# Patient Record
Sex: FEMALE | Race: WHITE | NOT HISPANIC OR LATINO | URBAN - METROPOLITAN AREA
[De-identification: names, ages, dates, MRNs, and addresses within clinical notes are randomized per-mention and may not be internally consistent; named-entity substitution may affect disease eponyms.]

---

## 2024-12-10 ENCOUNTER — OFFICE VISIT (OUTPATIENT)
Dept: URGENT CARE | Age: 44
End: 2024-12-10
Payer: COMMERCIAL

## 2024-12-10 VITALS
HEART RATE: 78 BPM | OXYGEN SATURATION: 98 % | TEMPERATURE: 98.3 F | SYSTOLIC BLOOD PRESSURE: 99 MMHG | RESPIRATION RATE: 16 BRPM | DIASTOLIC BLOOD PRESSURE: 68 MMHG

## 2024-12-10 DIAGNOSIS — R30.0 DYSURIA: ICD-10-CM

## 2024-12-10 DIAGNOSIS — R31.29 MICROSCOPIC HEMATURIA: ICD-10-CM

## 2024-12-10 DIAGNOSIS — N39.0 URINARY TRACT INFECTION WITHOUT HEMATURIA, SITE UNSPECIFIED: Primary | ICD-10-CM

## 2024-12-10 LAB
POC APPEARANCE, URINE: CLEAR
POC BILIRUBIN, URINE: NEGATIVE
POC BLOOD, URINE: ABNORMAL
POC COLOR, URINE: YELLOW
POC GLUCOSE, URINE: NEGATIVE MG/DL
POC KETONES, URINE: NEGATIVE MG/DL
POC LEUKOCYTES, URINE: NEGATIVE
POC NITRITE,URINE: NEGATIVE
POC PH, URINE: 6 PH
POC PROTEIN, URINE: NEGATIVE MG/DL
POC SPECIFIC GRAVITY, URINE: 1.02
POC UROBILINOGEN, URINE: 0.2 EU/DL

## 2024-12-10 PROCEDURE — 87086 URINE CULTURE/COLONY COUNT: CPT

## 2024-12-10 PROCEDURE — 99204 OFFICE O/P NEW MOD 45 MIN: CPT | Performed by: SPECIALIST

## 2024-12-10 PROCEDURE — 81003 URINALYSIS AUTO W/O SCOPE: CPT | Performed by: SPECIALIST

## 2024-12-10 PROCEDURE — 87186 SC STD MICRODIL/AGAR DIL: CPT

## 2024-12-10 RX ORDER — NITROFURANTOIN 25; 75 MG/1; MG/1
100 CAPSULE ORAL 2 TIMES DAILY
Qty: 14 CAPSULE | Refills: 0 | Status: SHIPPED | OUTPATIENT
Start: 2024-12-10 | End: 2024-12-17

## 2024-12-10 ASSESSMENT — ENCOUNTER SYMPTOMS
DYSURIA: 1
FREQUENCY: 1
GASTROINTESTINAL NEGATIVE: 1

## 2024-12-10 NOTE — PROGRESS NOTES
Subjective   Patient ID: Josephine Mancuso is a 43 y.o. female. They present today with a chief complaint of UTI (Pt presents with what she believes is a UTI burning and discomfort.  ).    History of Present Illness    UTI      Past Medical History  Allergies as of 12/10/2024 - Reviewed 12/10/2024   Allergen Reaction Noted    Egg GI Upset 09/30/2010    Gluten GI Upset 09/30/2010    Lactase GI Upset 09/30/2010       (Not in a hospital admission)       Past Medical History:   Diagnosis Date    Enterocolitis due to Clostridium difficile, not specified as recurrent     Clostridium difficile colitis       History reviewed. No pertinent surgical history.         Review of Systems  Review of Systems   Gastrointestinal: Negative.    Genitourinary:  Positive for dysuria and frequency.                                  Objective    Vitals:    12/10/24 1023   BP: 99/68   BP Location: Left arm   Patient Position: Sitting   Pulse: 78   Resp: 16   Temp: 36.8 °C (98.3 °F)   SpO2: 98%     Patient's last menstrual period was 11/14/2024 (exact date).    Physical Exam  Constitutional:       Appearance: Normal appearance.   Abdominal:      Tenderness: There is abdominal tenderness in the suprapubic area.   Neurological:      Mental Status: She is alert.         Procedures    Point of Care Test & Imaging Results from this visit  Results for orders placed or performed in visit on 12/10/24   POCT UA Automated manually resulted   Result Value Ref Range    POC Color, Urine Yellow Straw, Yellow, Light-Yellow    POC Appearance, Urine Clear Clear    POC Glucose, Urine NEGATIVE NEGATIVE mg/dl    POC Bilirubin, Urine NEGATIVE NEGATIVE    POC Ketones, Urine NEGATIVE NEGATIVE mg/dl    POC Specific Gravity, Urine 1.020 1.005 - 1.035    POC Blood, Urine SMALL (1+) (A) NEGATIVE    POC PH, Urine 6.0 No Reference Range Established PH    POC Protein, Urine NEGATIVE NEGATIVE, 30 (1+) mg/dl    POC Urobilinogen, Urine 0.2 0.2, 1.0 EU/DL    Poc Nitrite, Urine  NEGATIVE NEGATIVE    POC Leukocytes, Urine NEGATIVE NEGATIVE      No results found.    Diagnostic study results (if any) were reviewed by Reena Davis MD.    Assessment/Plan   Allergies, medications, history, and pertinent labs/EKGs/Imaging reviewed by Reena Davis MD.     Medical Decision Making      Orders and Diagnoses  Diagnoses and all orders for this visit:  Urinary tract infection without hematuria, site unspecified  -     POCT UA Automated manually resulted      Medical Admin Record      Patient disposition: Home    Electronically signed by Reena Davis MD  10:41 AM

## 2024-12-13 LAB — BACTERIA UR CULT: ABNORMAL

## 2024-12-22 ENCOUNTER — OFFICE VISIT (OUTPATIENT)
Dept: URGENT CARE | Age: 44
End: 2024-12-22
Payer: COMMERCIAL

## 2024-12-22 VITALS
WEIGHT: 113 LBS | TEMPERATURE: 98.2 F | OXYGEN SATURATION: 98 % | DIASTOLIC BLOOD PRESSURE: 62 MMHG | HEART RATE: 86 BPM | SYSTOLIC BLOOD PRESSURE: 101 MMHG | RESPIRATION RATE: 20 BRPM

## 2024-12-22 DIAGNOSIS — J40 BRONCHITIS: ICD-10-CM

## 2024-12-22 DIAGNOSIS — J06.9 VIRAL URI: Primary | ICD-10-CM

## 2024-12-22 PROCEDURE — 99213 OFFICE O/P EST LOW 20 MIN: CPT | Performed by: PHYSICIAN ASSISTANT

## 2024-12-22 RX ORDER — AZITHROMYCIN 250 MG/1
TABLET, FILM COATED ORAL
Qty: 6 TABLET | Refills: 0 | Status: SHIPPED | OUTPATIENT
Start: 2024-12-22 | End: 2024-12-26

## 2024-12-22 RX ORDER — METHYLPREDNISOLONE 4 MG/1
TABLET ORAL
Qty: 21 TABLET | Refills: 0 | Status: SHIPPED | OUTPATIENT
Start: 2024-12-22

## 2024-12-22 RX ORDER — BENZONATATE 200 MG/1
200 CAPSULE ORAL 3 TIMES DAILY PRN
Qty: 20 CAPSULE | Refills: 0 | Status: SHIPPED | OUTPATIENT
Start: 2024-12-22 | End: 2024-12-29

## 2024-12-22 ASSESSMENT — ENCOUNTER SYMPTOMS: COUGH: 1

## 2024-12-22 NOTE — PROGRESS NOTES
Subjective   Patient ID: Josephine Mancuso is a 44 y.o. female. They present today with a chief complaint of Cough (Week ).    History of Present Illness  Josephine is a 44 year old female no pmhx presents to  with cough and chest congestion over the past week. Seems progressive with associated fatigue and worsening cough at night. No fevers, SOB, CP or wheezing.       Cough        Past Medical History  Allergies as of 12/22/2024 - Reviewed 12/10/2024   Allergen Reaction Noted    Egg GI Upset 09/30/2010    Gluten GI Upset 09/30/2010    Lactase GI Upset 09/30/2010       (Not in a hospital admission)       Past Medical History:   Diagnosis Date    Enterocolitis due to Clostridium difficile, not specified as recurrent     Clostridium difficile colitis       No past surgical history on file.         Review of Systems  Review of Systems   Respiratory:  Positive for cough.                                   Objective    Vitals:    12/22/24 1635   BP: 101/62   Pulse: 86   Resp: 20   Temp: 36.8 °C (98.2 °F)   SpO2: 98%   Weight: 51.3 kg (113 lb)     Patient's last menstrual period was 11/15/2024.    Physical Exam  Vitals and nursing note reviewed.   Constitutional:       General: She is not in acute distress.     Appearance: Normal appearance.   HENT:      Head: Normocephalic and atraumatic.      Right Ear: Tympanic membrane and ear canal normal.      Left Ear: Tympanic membrane and ear canal normal.      Nose: No congestion or rhinorrhea.      Mouth/Throat:      Mouth: Mucous membranes are moist.      Pharynx: No oropharyngeal exudate or posterior oropharyngeal erythema.   Eyes:      Extraocular Movements: Extraocular movements intact.      Conjunctiva/sclera: Conjunctivae normal.      Pupils: Pupils are equal, round, and reactive to light.   Cardiovascular:      Rate and Rhythm: Normal rate and regular rhythm.      Heart sounds: No murmur heard.  Pulmonary:      Effort: Pulmonary effort is normal.      Breath sounds: Normal  breath sounds. No wheezing.   Skin:     General: Skin is warm and dry.   Neurological:      General: No focal deficit present.      Mental Status: She is alert and oriented to person, place, and time.   Psychiatric:         Mood and Affect: Mood normal.         Procedures    Point of Care Test & Imaging Results from this visit  No results found for this visit on 12/22/24.   No results found.    Diagnostic study results (if any) were reviewed by Azucena Vinson PA-C.    Assessment/Plan   Allergies, medications, history, and pertinent labs/EKGs/Imaging reviewed by Azucena Vinson PA-C.     Medical Decision Making  Pt presents with cough and chest congestion. Discussed symptoms and clinical presentation findings suggestive of an acute bronchitis likely secondary to viral URI. Advised continued close symptom monitoring and supportive treatment measures. Recommend OTC cough medication as needed for added symptom relief. Given the patient's duration of symptoms and lack of improvement with supportive treatment we agreed to initiate antimicrobial coverage and prescribed zithroma, medrol, benzonatate.  Close follow up with PCP as needed.      Orders and Diagnoses  Diagnoses and all orders for this visit:  Viral URI  Bronchitis  -     benzonatate (Tessalon) 200 mg capsule; Take 1 capsule (200 mg) by mouth 3 times a day as needed for cough for up to 7 days. Do not crush or chew.  -     azithromycin (Zithromax) 250 mg tablet; Take 2 tablets (500 mg) by mouth once daily for 1 day, THEN 1 tablet (250 mg) once daily for 4 days.  -     methylPREDNISolone (Medrol Dospak) 4 mg tablets; Take as directed on package.      Medical Admin Record      Patient disposition: Home    Electronically signed by Azucena Vinson PA-C  5:16 PM

## 2024-12-24 ENCOUNTER — TELEPHONE (OUTPATIENT)
Dept: URGENT CARE | Age: 44
End: 2024-12-24

## 2025-02-12 ENCOUNTER — OFFICE VISIT (OUTPATIENT)
Dept: URGENT CARE | Age: 45
End: 2025-02-12
Payer: COMMERCIAL

## 2025-02-12 DIAGNOSIS — J01.90 ACUTE NON-RECURRENT SINUSITIS, UNSPECIFIED LOCATION: Primary | ICD-10-CM

## 2025-02-12 RX ORDER — MAGNESIUM CARBONATE
325 POWDER (GRAM) MISCELLANEOUS
COMMUNITY

## 2025-02-12 RX ORDER — TAMOXIFEN CITRATE 20 MG/1
1 TABLET ORAL DAILY
COMMUNITY
Start: 2013-10-10

## 2025-02-12 RX ORDER — VIT C/E/ZN/COPPR/LUTEIN/ZEAXAN 250MG-90MG
CAPSULE ORAL
COMMUNITY

## 2025-02-12 RX ORDER — AMOXICILLIN AND CLAVULANATE POTASSIUM 875; 125 MG/1; MG/1
1 TABLET, FILM COATED ORAL 2 TIMES DAILY
Qty: 14 TABLET | Refills: 0 | Status: SHIPPED | OUTPATIENT
Start: 2025-02-12 | End: 2025-02-19

## 2025-02-12 ASSESSMENT — ENCOUNTER SYMPTOMS
SINUS PRESSURE: 1
COUGH: 1
FATIGUE: 1
SINUS PAIN: 1

## 2025-02-12 NOTE — PROGRESS NOTES
Subjective   Patient ID: Josephine Mancuso is a 44 y.o. female. They present today with a chief complaint of Nasal Congestion, Sinusitis, and Cough.    History of Present Illness  Patient is a 44-year-old female with history of C. difficile who presents urgent care today with a complaint of ongoing, worsening sinus pain/pressure and nasal congestion.  She states she originally started not feeling well on 2/4/2025.  She was seen at urgent care and diagnosed with a viral infection.  All testing was negative at that time.  She states she thought she was getting better but over the last couple days, her symptoms have returned and are significantly worse.  She has been using over-the-counter medication without significant relief.  She denies any chest pain or shortness of breath.  No other complaints or concerns mention at this time.      History provided by:  Patient  Sinusitis  Associated symptoms: congestion, cough and fatigue    Cough        Past Medical History  Allergies as of 02/12/2025 - Reviewed 02/12/2025   Allergen Reaction Noted    Egg GI Upset 09/30/2010    Gluten GI Upset 09/30/2010    Lactase GI Upset 09/30/2010    Medrol [methylprednisolone] GI Upset 02/12/2025       (Not in a hospital admission)         Past Medical History:   Diagnosis Date    Enterocolitis due to Clostridium difficile, not specified as recurrent     Clostridium difficile colitis       No past surgical history on file.         Review of Systems  Review of Systems   Constitutional:  Positive for fatigue.   HENT:  Positive for congestion, sinus pressure and sinus pain.    Respiratory:  Positive for cough.                                   Objective    There were no vitals filed for this visit.  No LMP recorded.    Physical Exam  Vitals and nursing note reviewed.   Constitutional:       General: She is not in acute distress.     Appearance: Normal appearance. She is not ill-appearing, toxic-appearing or diaphoretic.   HENT:      Head:  Normocephalic and atraumatic.      Right Ear: Tympanic membrane, ear canal and external ear normal.      Left Ear: Tympanic membrane, ear canal and external ear normal.      Nose: Congestion present.      Right Sinus: Maxillary sinus tenderness and frontal sinus tenderness present.      Left Sinus: Maxillary sinus tenderness and frontal sinus tenderness present.      Mouth/Throat:      Mouth: Mucous membranes are moist.      Pharynx: Posterior oropharyngeal erythema present. No oropharyngeal exudate.   Eyes:      Extraocular Movements: Extraocular movements intact.      Conjunctiva/sclera: Conjunctivae normal.      Pupils: Pupils are equal, round, and reactive to light.   Cardiovascular:      Rate and Rhythm: Normal rate and regular rhythm.      Pulses: Normal pulses.      Heart sounds: Normal heart sounds.   Pulmonary:      Effort: Pulmonary effort is normal. No respiratory distress.      Breath sounds: Normal breath sounds. No stridor. No wheezing, rhonchi or rales.   Chest:      Chest wall: No tenderness.   Musculoskeletal:         General: Normal range of motion.      Cervical back: Normal range of motion and neck supple.   Skin:     General: Skin is warm and dry.      Capillary Refill: Capillary refill takes less than 2 seconds.   Neurological:      General: No focal deficit present.      Mental Status: She is alert and oriented to person, place, and time.   Psychiatric:         Mood and Affect: Mood normal.         Behavior: Behavior normal.         Procedures      Assessment/Plan   Allergies, medications, history, and pertinent labs/EKGs/Imaging reviewed by OVI Mathis.     Medical Decision Making  Patient is well appearing, afebrile, non toxic, not hypoxic, and appropriate for outpatient treatment and management at time of evaluation.     Patient presents with ongoing, worsening sinus/pain pressure and nasal congestion as described above.    Differential includes but not limited to: Sinusitis,  URI, Migraine, other    Physical exam positive for severe nasal congestion and pain with palpation of the frontal and maxillary sinuses bilaterally.  History and exam consistent with acute sinusitis.     Patient was provided with a prescription for Augmentin to be used as directed. Red flags and ER precautions discussed. They will follow up with PCP in 2-3 days if not improving. Patient voices understanding and is agreeable to this plan. Discharged in stable condition. All questions and concerns addressed.    Orders and Diagnoses  Diagnoses and all orders for this visit:  Acute non-recurrent sinusitis, unspecified location  -     amoxicillin-pot clavulanate (Augmentin) 875-125 mg tablet; Take 1 tablet by mouth 2 times a day for 7 days.      Medical Admin Record      Follow Up Instructions  No follow-ups on file.    Patient disposition: Home    Electronically signed by OVI Mathis  12:35 PM